# Patient Record
(demographics unavailable — no encounter records)

---

## 2025-04-15 NOTE — PLAN
[TextEntry] : 1) Labs and PET scan to assess current disease burden 2) Follow-up FISH results 3) Fat pad biopsy to complete amyloidosis work-up 4) Return to this clinic after the above to review test results and to discuss management

## 2025-04-15 NOTE — ASSESSMENT
[FreeTextEntry1] : 78-year-old man with ISS/R-ISS pending, IgG lambda multiple myeloma with 40-50% bone marrow plasmacytosis. The patient has anemia (improving since hospitalization) and CKD, but the extent of myeloma-related organ damage is not clear, as the patient has other potential contributing co-morbidities.   We reviewed the natural history and management of multiple myeloma. It was explained that multiple myeloma (MM) is a blood cancer characterized by the abnormal proliferation of clonal plasma cells in the bone marrow. Diagnosis requires clinical evaluation, laboratory tests, imaging, and bone marrow biopsy. Laboratory findings commonly include an elevated monoclonal protein (M-protein) detected in serum or urine using electrophoresis and/or immunofixation, along with an abnormal serum free light chain ratio. Bone marrow biopsy confirming at least 10% clonal plasma cells is a key diagnostic criterion. Patients often present with CRAB features - hypercalcemia, renal impairment, anemia, and/or bone lesions - indicative of organ damage associated with MM. Imaging modalities such as whole-body low-dose CT or PET-CT are used to identify lytic bone lesions or extramedullary disease. The Revised International Staging System (R-ISS), incorporating serum beta-2 microglobulin, albumin level, lactate dehydrogenase (LDH), and cytogenetic abnormalities, is used to stage MM and predict prognosis.  Although MM remains incurable, advances in treatment have improved outcomes for patients. The treatment of MM aims to control disease progression, improve quality of life, and extend survival. Therapy typically consists of three phases: induction, consolidation, and maintenance. Induction therapy is the initial treatment and usually includes a combination of a monoclonal antibody (e.g., daratumumab or isatuximab), proteasome inhibitor (e.g., bortezomib or carfilzomib), immunomodulatory agent (e.g., lenalidomide), and a steroid (e.g., dexamethasone). Treatment regimens are tailored to an individual's age, comorbidities, performance status, and disease risk factors.  Autologous stem cell transplantation (ASCT) is a cornerstone of consolidation therapy for eligible patients. High-dose chemotherapy with melphalan is administered to eradicate residual myeloma cells, followed by reinfusion of autologous stem cells to restore bone marrow function. Subsequently, maintenance therapy is initiated to prolong disease control and delay relapse. Agents such as lenalidomide, daratumumab, and bortezomib are used during this phase. In cases of relapsed or refractory MM, newer treatment options have expanded significantly, with the choice of treatment based on comorbidities, prior therapies, and resistance mechanisms.  Supportive care plays a key role in managing MM. Bisphosphonates such as zoledronic acid or denosumab are used to prevent fractures and treat bone lesions. Given the immunosuppressive nature of the disease and its treatment, infection prevention strategies, including vaccinations and prophylactic antibiotics or antivirals, are essential. Additionally, symptom management for pain, anemia, and renal dysfunction is integral to improving quality of life.  All questions were answered to the patient's apparent satisfaction. It was explained that the immediate next step is to assess his current myeloma disease burden with labs and a PET scan.

## 2025-04-15 NOTE — HISTORY OF PRESENT ILLNESS
[de-identified] : Mr. MCNALLY is a 78-year-old man who initially presented to [facility] [place] on 2025 with progressive SOB and bilateral leg swelling over 1 week. He was admitted for acute-on-chronic HFpEF exacerbation, which was managed with diuresis that was discontinued for worsening CORTNEY. Amiodarone 200 mg PO qd was also started but held for worsening transaminitis. On 2025, CXR showed prominent interstitial markings, mild nonspecific confluent opacities lower lungs worse on the right. On 2025, CT chest w/out contrast showed a large consolidation at right lung base with pleural effusion, moderate pleural effusion and consolidation at left lung base, mediastinal and axillary lymphadenopathy, and ascites.  On 2025, the patient was transferred to [facility] for further management of acute-on-chronic HFpEF, rule-out cardiac amyloidosis. On 2025, lab results included WBC 8.72, HGB 9.7, , CREAT 1.83, CA 8.9, TP 7.8, ALB 3.2, M-spike 2.5, FLC: kappa 2.74, lambda 35.9, K/L ratio 0.08, pro-BNP 4699.   On 2025, ECHO was notable for LVEF 56%, LV wall with a speckled appearance, reduced RV systolic function, severe tricuspid regurgitation, and no pericardial effusion. On 2025, US bilateral lower extremities showed no evidence of deep venous thrombosis in either lower extremity. On 2025, NM amyloidosis SPECT/CT showed no evidence of TTR-mediated cardiac amyloidosis.   On 2024, bone marrow biopsy showed a lambda-restricted plasma cell neoplasm involving 40-50% of bone marrow cellularity with negative Congo red stain and myeloma FISH panel pending. On 04/10/2025, lab results included IgG 2908, IgA 33, IgM 247, FLC: kappa 3.61, lambda 53.09, K/L ratio 0.07. On 2025, lab results included WBC 6.59, HGB 9.9, , CREAT 1.45, CA 8.9, TP 8.6, ALB 3.1. On 2025, the patient was discharged s/p medical management with diuresis and medication optimization.   Today, the patient presents for initial consultation. He notes overall improvement since his initial presentation at [facility]. Dyspnea on exertion and lower extremity swelling are markedly improved. Has moderate fatigue and naps for about 2 hours each day. During awaking hours, does light activity around the house. Has a diminished appetite that is improving. Denies fevers, chills, lightheadedness, shortness of breath at rest, nausea, emesis, diarrhea, abdominal pain, bleeding, pain, or other complaints.    PAST MEDICAL HISTORY: HFpEF (EF 55-60% by TTE 3/2025), non-obstructive CAD s/p PTCA , paroxysmal atrial fibrillation s/p DCCV 10/17/2024, GIB with anemia 2025, peripheral artery disease s/p angioplasty in RLE 2024, varicose veins s/p R EVLT of the GSV 10/2024, BPH, asthma.   PAST SURGICAL HISTORY: see PMH   SOCIAL HISTORY: Relationship status:  Children: 1 daughter Occupation: retired - various jobs in the past Residence: lives with wife in [place], [place] Alcohol: occasional - 3 beers/week Tobacco: former smoker, quit >40 years ago Illicit drugs: denies  FAMILY HISTORY: No known history of hematologic malignancies.  CURRENT MEDICATIONS: as listed below   ALLERGIES: No known drug allergies   REVIEW OF SYSTEMS: Negative in detail except as noted above  PHYSICAL EXAM: KPS: 60-70 GEN: Elderly man, no acute distress HEENT: NCAT, EOMI, anicteric sclera  HEART: Regular rate  CHEST: Lung sounds diminished at the bases, otherwise clear bilaterally ABOMEN: Soft, non-tender, non-distended BACK: No spinal or CVA tenderness EXTREMITIES: 2+ bilateral lower extremity edema NEURO: No gross neurologic deficits, speech fluent PSYCH: Alert and oriented, appropriate mood/affect  RESULTS/DATA IMAGIN2025 ECHO: 1. Left ventricular cavity is normal in size. Left ventricular wall thickness is mildly increased. Left ventricular systolic function is normal with an ejection fraction of 56% by Bosch's method of disks. There are no regional wall motion abnormalities seen. 2. LV wall with a speckled appearance. Consider further imaging for infiltration if clinically indicated. 3. Mildly enlarged right ventricular cavity size, with normal wall thickness, and reduced right ventricular systolic function. 4. Biatrial enlargement. 5. Mild mitral regurgitation. 6. Severe tricuspid regurgitation with systolic hepatic vein flow reversal. Triscuspid leaflets are not coapting leading to open regurgitation. 7. The inferior vena cava is dilated (dilated >2.1cm) with abnormal inspiratory collapse (abnormal <50%) consistent with elevated right atrial pressure (\R\15, range 10-20mmHg). 8. No pericardial effusion seen. 9. No prior echocardiogram is available for comparison.   2025 Bilateral US Duplex Venous Lower Ext Complete, Bilateral: No evidence of deep venous thrombosis in either lower extremity.  2025 NM Amyloidosis SPECT/CT: Findings: There is a normal distribution of the radiopharmaceutical in the bony skeleton. Minimal amount of blood pool activity is also identified, but no myocardial uptake is seen. Small right pleural effusion with compressive atelectasis of the adjacent lung parenchyma. Trace left pleural effusion. Cardiomegaly. Impression: There is no evidence of TTR-mediated cardiac amyloidosis.   PATHOLOGY: 2024 bone marrow biopsy: Plasma cell neoplasm, involving 40-50% of bone marrow cellularity. See note. Bone marrow, flow cytometric immunophenotyping: Lambda-monotypic plasma cells. See flow cytometric analysis. No increase in CD34-positive blasts. Heterogeneous lymphocytes without aberrant antigen expression or B-cell monotypia. Note: Immunohistochemical stains with adequate controls on blocks 1A and 2A show the neoplastic plasma cells to be positive for , MUM1, and  (weak); and are lambda-restricted by in-situ hybridization. Congo red amyloid is not detected. Myeloma FISH panel is pending and the results will be issued in an addendum.   MYELOMA LABS: 2025: M-spike 2.5. FLC: kappa 2.74, lambda 35.9, K/L ratio 0.08 04/10/2025: IgG 2908, IgA 33, IgM 247. FLC: kappa 3.61, lambda 53.09, K/L ratio 0.07 04/15/2025: pending

## 2025-04-18 NOTE — ASSESSMENT
[FreeTextEntry1] : 78 year old Estonian speaking man with history of HFpEF, RVSD, severe TR, nonobstructive CAD, AF s/p DCCV (10/2024 at [facility]) no longer on AC following GIB (2/2025), PAD s/p RLE stents, asthma and recent diagnosis of IgG lambda multiple myeloma who is being seen for discharge follow-up. He is ACC/AHA Stage C, NYHA Class difficult to discern given degree if frailty and deconditioned state. He is euvolemic and low normotensive on Midodrine.   # Chronic heart failure with preserved ejection fraction (HFpEF).  - Etiology: Negative TTR amyloid workup with Tc-PYP scan. While FLC ratio of 0.08 with elevated Lambda of 35.9 and lambda bands identified on urine/serum immunofixation, bone marrow biopsy consistent with MM. Scheduled for fat pad biopsy 4/22 for completion of amyloid workup.  - Diuresis: Continue Bumex 2 mg BID  - Continue Spironolactone to 25 mg BID and Farxiga 10 mg QD - BP: Continue Midodrine 5 mg TID, Home SBP with this is generally high 80s - Labs from 4/15 with Na 132 (from 134), K 4.4, Cr 1.7 (from 1.45), AST/ALT 47/29 (from 34/23), ALK/PHOS 226 (from 285) and pro-BNP on admission was 4699. Repeat labs in one week on 4/23   # Multiple Myeloma  - Bone Marrow Bx 4/11/2025: +plasma cell neoplasm involving 40-50% of bone marrow cellularity with negative Congo red stain and myeloma - Planned for fat pad bx 4/22 to complete eval for al-amyloid and PET-CT 4/24 to assess disease burden - FISH panel pending - Followed by heme/onc Dr. Berumen  # Severe tricuspid regurgitation.  - TTE 3/24 with severe TR - Will reassess following optimization as above and diuresis.  # Mild CAD.   - No formal report available but per transfer records, [facility] [place] 2/2025 with nonobstructive CAD - On ASA and Atorvastatin 40.  # PAD (peripheral artery disease).  - History of prior angioplasty to RLE, unclear when - ASA and Statin as above.  # Paroxysmal atrial fibrillation.   - Underwent DCCV 10/2024 - Off AC since GIB 2/2025 - Rate controlled.  # CORTNEY (acute kidney injury).  - Unclear prior Cr but on transfer to [facility], Cr was 1.8, peaked at 2.7 and discharged at 1.4 - Continue SGLT2i to delay further progression   Follow-up with Dr. Egan in 3 weeks and in the interim, continue care with Dr. Stallworth

## 2025-04-18 NOTE — CARDIOLOGY SUMMARY
[de-identified] : TTE 3/24/25: LVIDd 4.3 cm, LVEF 56%, LVH (septum 1, PWT 1.1), speckled LV wall, mild RVE with reduced function (TAPSE 1.1), mild MICHELLE, severe TR, est PASP 27 mmHg, dilated IVC [de-identified] : [facility] 2/2025 at [place]: no report available, reportedly nonobstructive  [de-identified] : Tc-PYP 4/1/25: No suggestive of TTR amyloid   AL Amyloid workup 3/24/25: Immunoglobulin FLC ratio 0.08 (Kappa 2.7, Lambda 35.9). Urine and serum immunofixation identified Lambda bands.  Bone Marrow Bx 4/11/2025: +plasma cell neoplasm involving 40-50% of bone marrow cellularity with negative Congo red stain and myeloma

## 2025-04-18 NOTE — CARDIOLOGY SUMMARY
[de-identified] : TTE 3/24/25: LVIDd 4.3 cm, LVEF 56%, LVH (septum 1, PWT 1.1), speckled LV wall, mild RVE with reduced function (TAPSE 1.1), mild MICHELLE, severe TR, est PASP 27 mmHg, dilated IVC [de-identified] : [facility] 2/2025 at [place]: no report available, reportedly nonobstructive  [de-identified] : Tc-PYP 4/1/25: No suggestive of TTR amyloid   AL Amyloid workup 3/24/25: Immunoglobulin FLC ratio 0.08 (Kappa 2.7, Lambda 35.9). Urine and serum immunofixation identified Lambda bands.  Bone Marrow Bx 4/11/2025: +plasma cell neoplasm involving 40-50% of bone marrow cellularity with negative Congo red stain and myeloma

## 2025-04-18 NOTE — ASSESSMENT
[FreeTextEntry1] : 78 year old Portuguese speaking man with history of HFpEF, RVSD, severe TR, nonobstructive CAD, AF s/p DCCV (10/2024 at [facility]) no longer on AC following GIB (2/2025), PAD s/p RLE stents, asthma and recent diagnosis of IgG lambda multiple myeloma who is being seen for discharge follow-up. He is ACC/AHA Stage C, NYHA Class difficult to discern given degree if frailty and deconditioned state. He is euvolemic and low normotensive on Midodrine.   # Chronic heart failure with preserved ejection fraction (HFpEF).  - Etiology: Negative TTR amyloid workup with Tc-PYP scan. While FLC ratio of 0.08 with elevated Lambda of 35.9 and lambda bands identified on urine/serum immunofixation, bone marrow biopsy consistent with MM. Scheduled for fat pad biopsy 4/22 for completion of amyloid workup.  - Diuresis: Continue Bumex 2 mg BID  - Continue Spironolactone to 25 mg BID and Farxiga 10 mg QD - BP: Continue Midodrine 5 mg TID, Home SBP with this is generally high 80s - Labs from 4/15 with Na 132 (from 134), K 4.4, Cr 1.7 (from 1.45), AST/ALT 47/29 (from 34/23), ALK/PHOS 226 (from 285) and pro-BNP on admission was 4699. Repeat labs in one week on 4/23   # Multiple Myeloma  - Bone Marrow Bx 4/11/2025: +plasma cell neoplasm involving 40-50% of bone marrow cellularity with negative Congo red stain and myeloma - Planned for fat pad bx 4/22 to complete eval for al-amyloid and PET-CT 4/24 to assess disease burden - FISH panel pending - Followed by heme/onc Dr. Berumen  # Severe tricuspid regurgitation.  - TTE 3/24 with severe TR - Will reassess following optimization as above and diuresis.  # Mild CAD.   - No formal report available but per transfer records, [facility] at [place] 2/2025 with nonobstructive CAD - On ASA and Atorvastatin 40.  # PAD (peripheral artery disease).  - History of prior angioplasty to RLE, unclear when - ASA and Statin as above.  # Paroxysmal atrial fibrillation.   - Underwent DCCV 10/2024 - Off AC since GIB 2/2025 - Rate controlled.  # CORTNEY (acute kidney injury).  - Unclear prior Cr but on transfer to [facility], Cr was 1.8, peaked at 2.7 and discharged at 1.4 - Continue SGLT2i to delay further progression   Follow-up with Dr. Egan in 3 weeks and in the interim, continue care with Dr. Stallworth

## 2025-04-18 NOTE — PHYSICAL EXAM
[Normal Conjunctiva] : normal conjunctiva [Normal Venous Pressure] : normal venous pressure [Soft] : abdomen soft [Non Tender] : non-tender [Normal Radial B/L] : normal radial B/L [Normal] : alert and oriented, normal memory [de-identified] : Frail appearing [de-identified] : No perioral cyanosis, MMM  [de-identified] : JVP 8 cm H2O, no HJR [de-identified] : Using walker [de-identified] : RLE trace edema [de-identified] : Warm peripherally

## 2025-04-18 NOTE — HISTORY OF PRESENT ILLNESS
[FreeTextEntry1] : HF - Dr. Egan  Mr. Pisano is a 78 year old Telugu speaking man with history of HFpEF, RVSD, severe TR, nonobstructive CAD, AF s/p DCCV (10/2024 at [facility]) no longer on AC following GIB (2/2025), PAD s/p RLE stents, asthma and recent diagnosis of IgG lambda multiple myeloma who presents for discharge follow-up.   He initially presented to [facility] 3/14/2025 for ADHF and transferred to [facility] 3/24-4/13/2025 for Amyloid workup as prior colonic biopsy tested positive and TTE notable for LVH and speckled LV wall. He was initiated on low dose Milrinone for concern of RV failure with worsening perfusion markers. Diuresed with a Bumex infusion approx 32 lbs and was successfully weaned of Milrinone on 4/3. Amyloid workup with abnormal K/L ratio and negative tcPYP scan leading to high suspicion for AL amyloid prompting bone marrow biopsy performed 4/9. Pathology with plasma cell neoplasm involving 40-50% of bone marrow cellularity and BM flow cytometry shows lambda-monotypic plasma cells. He was discharged at a weight of 144 lbs on Bumex 2 mg BID. He followed-up with Dr. Ata workman as outpatient and is now planned for fat pad biopsy and PET-CT.   Here today with his wife. Since discharge, he feels deconditioned but no issues with SOB though is minimally active, using a walker. Clinic weight today is down to 131 lbs. Reports home weight after discharge was initially 146 lbs but gradually declining, yesterday 127 lbs. Appetite is fair, eating three small meals. He is taking Midodrine 5 mg TID and BP remains marginal, systolic generally high 80s and HR 80s. Yesterday night, noted an episode of LH/dizziness, no presyncope or fall. No correlating CP or palpitations. He denies orthopnea, PND, ABD discomfort and LE swelling.

## 2025-04-18 NOTE — HISTORY OF PRESENT ILLNESS
[FreeTextEntry1] : HF - Dr. Egan  Mr. Pisano is a 78 year old Mongolian speaking man with history of HFpEF, RVSD, severe TR, nonobstructive CAD, AF s/p DCCV (10/2024 at [facility]) no longer on AC following GIB (2/2025), PAD s/p RLE stents, asthma and recent diagnosis of IgG lambda multiple myeloma who presents for discharge follow-up.   He initially presented to [facility] 3/14/2025 for ADHF and transferred to [facility] 3/24-4/13/2025 for Amyloid workup as prior colonic biopsy tested positive and TTE notable for LVH and speckled LV wall. He was initiated on low dose Milrinone for concern of RV failure with worsening perfusion markers. Diuresed with a Bumex infusion approx 32 lbs and was successfully weaned of Milrinone on 4/3. Amyloid workup with abnormal K/L ratio and negative tcPYP scan leading to high suspicion for AL amyloid prompting bone marrow biopsy performed 4/9. Pathology with plasma cell neoplasm involving 40-50% of bone marrow cellularity and BM flow cytometry shows lambda-monotypic plasma cells. He was discharged at a weight of 144 lbs on Bumex 2 mg BID. He followed-up with Dr. Ata workman as outpatient and is now planned for fat pad biopsy and PET-CT.   Here today with his wife. Since discharge, he feels deconditioned but no issues with SOB though is minimally active, using a walker. Clinic weight today is down to 131 lbs. Reports home weight after discharge was initially 146 lbs but gradually declining, yesterday 127 lbs. Appetite is fair, eating three small meals. He is taking Midodrine 5 mg TID and BP remains marginal, systolic generally high 80s and HR 80s. Yesterday night, noted an episode of LH/dizziness, no presyncope or fall. No correlating CP or palpitations. He denies orthopnea, PND, ABD discomfort and LE swelling.

## 2025-04-18 NOTE — PHYSICAL EXAM
[Normal Conjunctiva] : normal conjunctiva [Normal Venous Pressure] : normal venous pressure [Soft] : abdomen soft [Non Tender] : non-tender [Normal Radial B/L] : normal radial B/L [Normal] : alert and oriented, normal memory [de-identified] : Frail appearing [de-identified] : No perioral cyanosis, MMM  [de-identified] : JVP 8 cm H2O, no HJR [de-identified] : Using walker [de-identified] : RLE trace edema [de-identified] : Warm peripherally

## 2025-04-23 NOTE — HISTORY OF PRESENT ILLNESS
[House Calls Co-Management Patient] : [unfilled] is a House Calls co-management patient [Education provided] : education provided [Family Member] : family member [Formal Caregiver] : formal caregiver [LastPVisitDate] : 4/23/25 [FreeTextEntry1] : HFpEF, non obs CAD, Afib (not on AC due to GIB 2/2025), PAD s/p RLE intervention (stents), multiple myeloma (on bone biopsy) establishing care. [FreeTextEntry2] :  RYDER MCNALLY is being seen for a visit provided via telehealth real-time audio visual technology.  RYDER MCNALLY was located in their home, [address] [place] [address], at the time of the visit.  The house calls clinician, DEIRDRE MENCHACA, was located remotely at their home in [place] at the time of the visit.  The patient, RYDER MCNALLY and the house calls clinician DEIRDRE MENCHACA, participated in the telehealth encounter. other participants included:  RYDER MCNALLY (Jul 10 1946) or his/her representative consents to the use of telehealth.  All questions related to telehealth answered.  78M Samoan-speaking former smoker w/ HFpEF, RVSD, severe TR, nonobstructive CAD, AF s/p DCCV (10/2024 at [facility]) no longer on AC following GIB (2/2025), PAD s/p RLE stents and asthma who initially presented to [facility] 3/14 for ADHF and transferred to [facility] for Amyloid workup.  3/24-4/13/25 [facility] admission for HFpEF decomp. Was on milrinone for RV failure, diuresed 32 lbs. PYP scan neg TTR but still susp for amyloid, s/p BM biopsy 4/9 consistent w MM. DC wt 131 lbs. On midodrine 5 TID  Patient denies fever, cough, trouble breathing, rash, vomiting and diarrhea. Patient has not been in close contact with someone covid positive.  N95 mask, gloves, eye wear and gown used during visit: [Y]. Total face to face time with patient is 60 min     #Amyloidosis workup       -NM Amyloidosis SPECT/CT 4/1/25 shows no evidence of TTR-mediated cardiac amyloidosis. -bone marrow biopsy at bedside was unsuccessful, and IR guided bone marrow biopsy performed on 4/9/25 -Bone marrow pathology results released 4/11/25 show plasma cell neoplasm involving 40-50% of bone marrow cellularity. -BM flow cytometry shows lambda-monotypic plasma cells -He has an appointment with Dr. Fredi Berumen (myeloma specialist) on 4/15 at 10am    .

## 2025-04-23 NOTE — HISTORY OF PRESENT ILLNESS
[FreeTextEntry1] : 78M w HFpEF, non obs CAD, Afib (not on AC due to GIB 2/2025), PAD s/p RLE intervention (stents), multiple myeloma (on bone biopsy) establishing care  3/24-4/13/25 [facility] admission for HFpEF decomp. Was on milrinone for RV failure, diuresed 32 lbs. PYP scan neg TTR but still susp for amyloid, s/p BM biopsy 4/9 consistent w MM. DC wt 131 lbs. On midodrine 5 TID  4/23/25 NEW:

## 2025-04-23 NOTE — CHRONIC CARE ASSESSMENT
[Limited decision making ability] : limited decision making ability [de-identified] : as tolerated [de-identified] : low sodium [PPS Score: ____] : Palliative Performance Scale (PPS) Score: [unfilled]

## 2025-04-23 NOTE — CHRONIC CARE ASSESSMENT
[Limited decision making ability] : limited decision making ability [de-identified] : as tolerated [de-identified] : low sodium [PPS Score: ____] : Palliative Performance Scale (PPS) Score: [unfilled]

## 2025-04-23 NOTE — HEALTH RISK ASSESSMENT
[HRA Reviewed] : Health risk assessment reviewed [Some assistance needed] : managing finances [No falls in past year] : Patient reported no falls in the past year [Yes] : The patient does have visual impairment [TimeGetUpGo] : 0

## 2025-04-23 NOTE — HISTORY OF PRESENT ILLNESS
[House Calls Co-Management Patient] : [unfilled] is a House Calls co-management patient [Education provided] : education provided [Family Member] : family member [Formal Caregiver] : formal caregiver [LastPVisitDate] : 4/23/25 [FreeTextEntry1] : HFpEF, non obs CAD, Afib (not on AC due to GIB 2/2025), PAD s/p RLE intervention (stents), multiple myeloma (on bone biopsy) establishing care. [FreeTextEntry2] :  RYDER MCNALLY is being seen for a visit provided via telehealth real-time audio visual technology.  RYDER MCNALLY was located in their home, [address] [place] [address], at the time of the visit.  The house calls clinician, DEIRDRE MENCHACA, was located remotely at their home in [place] at the time of the visit.  The patient, RYDER MCNALLY and the house calls clinician DEIRDRE MENCHACA, participated in the telehealth encounter. other participants included:  RYDER MCNALLY (Jul 10 1946) or his/her representative consents to the use of telehealth.  All questions related to telehealth answered.  78M [place] citizen-speaking former smoker w/ HFpEF, RVSD, severe TR, nonobstructive CAD, AF s/p DCCV (10/2024 at [facility]) no longer on AC following GIB (2/2025), PAD s/p RLE stents and asthma who initially presented to [facility] 3/14 for ADHF and transferred to [facility] for Amyloid workup.  3/24-4/13/25 [facility] admission for HFpEF decomp. Was on milrinone for RV failure, diuresed 32 lbs. PYP scan neg TTR but still susp for amyloid, s/p BM biopsy 4/9 consistent w MM. DC wt 131 lbs. On midodrine 5 TID  Patient denies fever, cough, trouble breathing, rash, vomiting and diarrhea. Patient has not been in close contact with someone covid positive.  N95 mask, gloves, eye wear and gown used during visit: [Y]. Total face to face time with patient is 60 min     #Amyloidosis workup       -NM Amyloidosis SPECT/CT 4/1/25 shows no evidence of TTR-mediated cardiac amyloidosis. -bone marrow biopsy at bedside was unsuccessful, and IR guided bone marrow biopsy performed on 4/9/25 -Bone marrow pathology results released 4/11/25 show plasma cell neoplasm involving 40-50% of bone marrow cellularity. -BM flow cytometry shows lambda-monotypic plasma cells -He has an appointment with Dr. Fredi Berumen (myeloma specialist) on 4/15 at 10am    .

## 2025-04-23 NOTE — REASON FOR VISIT
[Initial Evaluation] : an initial evaluation [FreeTextEntry1] : HFpEF, non obs CAD, Afib (not on AC due to GIB 2/2025), PAD s/p RLE intervention (stents), multiple myeloma (on bone biopsy) establishing care.

## 2025-04-23 NOTE — COUNSELING
[Normal Weight - ( BMI  <25 )] : normal weight - ( BMI  <25 ) [Hypertension self management education material provided] : hypertension self management education material provided [Non - Smoker] : non-smoker [Use assistive device to avoid falls] : use assistive device to avoid falls [] : foot exam [Patient not on disease-modifying anti-rheumatic drug due to overall prognosis] : Patient not on disease-modifying anti-rheumatic drug due to overall prognosis [Not Recommended] : Aspirin use not recommended due to overall prognosis [Decrease hospital use] : decrease hospital use [Discussed disease trajectory with patient/caregiver] : discussed disease trajectory with patient/caregiver [ - New patient with 2 or more chronic conditions; CCM discussed and patient-centered care plan established] : New patient with 2 or more chronic conditions; CCM discussed and patient-centered care plan established [FreeTextEntry4] : Educated patient/legal representative about CCM services and consent. Educated patient/legal representative that this service is available because they have 2 or more chronic conditions, that only one Provider can furnish CCM Services per calendar month and that CCM services are subject to the usual Medicare deductible and coinsurance.  Patient/legal representative understands the right to stop the CCM services at any time by notifying [facility] office. Patient/legal representative has verbally consented (4/2025)

## 2025-05-16 NOTE — ASSESSMENT
[FreeTextEntry1] : (HFpEF) heart failure with preserved ejection fraction (428.9) (I50.30)  78 year old [place] speaking man with history of HFpEF, RVSD, severe TR, nonobstructive CAD, AF s/p DCCV (10/2024 at [facility]) no longer on AC following GIB (2/2025), PAD s/p RLE stents, asthma and recent diagnosis of IgG lambda multiple myeloma who is being seen for discharge follow-up. He is ACC/AHA Stage C, NYHA Class difficult to discern given degree if frailty and deconditioned state. He is euvolemic and low normotensive on Midodrine.  He is probably in borderline low flow state given hyponatremia but hypovolemia could be playing a role. Bumex is down to QOD 2 mg.  # Chronic heart failure with preserved ejection fraction (HFpEF). 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 Berumen at [facility]  # Severe tricuspid regurgitation. - TTE 3/24 with severe TR - Will reassess following optimization as above and diuresis.  # Mild CAD.  - No formal report available but per transfer records, [facility] [place] 2/2025 with nonobstructive CAD - On ASA and Atorvastatin 40.  # PAD (peripheral artery disease). - History of prior angioplasty to RLE, unclear when - ASA and Statin as above.  # Paroxysmal atrial fibrillation.  - Underwent DCCV 10/2024 - Off AC since GIB 2/2025 - Rate controlled  # CORTNEY (acute kidney injury). - Unclear prior Cr but on transfer to [facility], Cr was 1.8, peaked at 2.7 and discharged at 1.4 - Continue SGLT2i to delay further progression  RTC in 1 month

## 2025-05-16 NOTE — HISTORY OF PRESENT ILLNESS
[FreeTextEntry1] : Mr. Pisano is a 78-year-old [place] speaking man with history of HFpEF, RVSD, severe TR, nonobstructive CAD, AF s/p DCCV (10/2024 at [facility]) no longer on AC following GIB (2/2025), PAD s/p RLE stents, asthma and recent diagnosis of IgG lambda multiple myeloma who presents for discharge follow-up.  He initially presented to [facility] 3/14/2025 for ADHF and transferred to [facility] 3/24-4/13/2025 for Amyloid workup as prior colonic biopsy tested positive and TTE notable for LVH and speckled LV wall. He was initiated on low dose Milrinone for concern of RV failure with worsening perfusion markers. Diuresed with a Bumex infusion approx 32 lbs and was successfully weaned of Milrinone on 4/3. Amyloid workup with abnormal K/L ratio and negative tcPYP scan leading to high suspicion for AL amyloid prompting bone marrow biopsy performed 4/9. Pathology with plasma cell neoplasm involving 40-50% of bone marrow cellularity and BM flow cytometry shows lambda-monotypic plasma cells. He was discharged at a weight of 144 lbs on Bumex 2 mg BID. He followed-up with Dr. Ata workman as outpatient and is now planned for fat pad biopsy and PET-CT.  Currently he is doing well with no anorexia, abdominal pain or nausea/vomiting.  No orthopnea, PND or LE edema.  No palpitations, no CP.  Saw Dr. Berumen at [facility] - underwent initial testing last week.

## 2025-05-16 NOTE — CARDIOLOGY SUMMARY
[de-identified] : PET: 1. No FDG avid lytic lesions nor plasmacytomas. 2. A few foci of indeterminate FDG uptake, as above. These can be evaluated on follow-up PET/CT. [de-identified] : Echo: TTE 3/24/25: LVIDd 4.3 cm, LVEF 56%, LVH (septum 1, PWT 1.1), speckled LV wall, mild RVE with reduced function (TAPSE 1.1), mild MICHELLE, severe TR, est PASP 27 mmHg, dilated IVC   Cardiac Cath/PCI: [facility] 2/2025 at [place]: no report available, reportedly nonobstructive   Other: Tc-PYP 4/1/25: No suggestive of TTR amyloid  AL Amyloid workup 3/24/25: Immunoglobulin FLC ratio 0.08 (Kappa 2.7, Lambda 35.9). Urine and serum immunofixation identified Lambda bands.  Bone Marrow Bx 4/11/2025: +plasma cell neoplasm involving 40-50% of bone marrow cellularity with negative Congo red stain and myeloma

## 2025-05-16 NOTE — DISCUSSION/SUMMARY
[FreeTextEntry1] : Spoke with wife Aminata to inquire how Clif is doing. He reports persistent fatigue and appetite is now diminished. Weight unchanged at 127 lbs (weight on discharge was 144 lbs). SBP remains marginal 89-98.   Advised to reduce Bumex to 2 mg QD (from 2 mg BID). To contact our office for signs of fluid retention including sudden weight gain, ABD distention, LE swelling and increased BROWN. He is pending labs this week.

## 2025-05-16 NOTE — ADDENDUM
[FreeTextEntry1] : ADDENDUM 4/29  Labs from 4/25 reviewed with Dr. Egan. Hyponatremia and CORTNEY worsening, Na now 129 (from 132) and BUN/Cr 87/2.35 (from 47/1.7 and Cr of 1.45 on discharge). Pro-BNP is elevated at 4700 which is unchanged from when he was admitted and markedly congested. Instructed spouse to further lower Bumex to 2 mg QOD. Arranged for homedraw on Friday with low threshold for admission for trial of inotropes.